# Patient Record
Sex: FEMALE | Race: ASIAN | NOT HISPANIC OR LATINO | Employment: FULL TIME
[De-identification: names, ages, dates, MRNs, and addresses within clinical notes are randomized per-mention and may not be internally consistent; named-entity substitution may affect disease eponyms.]

---

## 2017-02-21 ENCOUNTER — HOSPITAL (OUTPATIENT)
Dept: OTHER | Age: 52
End: 2017-02-21
Attending: INTERNAL MEDICINE

## 2017-10-13 ENCOUNTER — HOSPITAL ENCOUNTER (OUTPATIENT)
Dept: ULTRASOUND IMAGING | Facility: HOSPITAL | Age: 52
Discharge: HOME | End: 2017-10-13
Attending: INTERNAL MEDICINE
Payer: COMMERCIAL

## 2017-10-13 DIAGNOSIS — R94.5 ABNORMAL RESULTS OF LIVER FUNCTION STUDIES: ICD-10-CM

## 2017-10-13 PROCEDURE — 76700 US EXAM ABDOM COMPLETE: CPT | Performed by: INTERNAL MEDICINE

## 2017-12-14 PROBLEM — Z13.220 SCREENING FOR CHOLESTEROL LEVEL: Status: ACTIVE | Noted: 2017-12-14

## 2017-12-14 PROBLEM — Z13.29 SCREENING FOR THYROID DISORDER: Status: ACTIVE | Noted: 2017-12-14

## 2017-12-14 PROBLEM — Z13.1 SCREENING FOR DIABETES MELLITUS: Status: ACTIVE | Noted: 2017-12-14

## 2017-12-14 PROBLEM — Z13.21 ENCOUNTER FOR VITAMIN DEFICIENCY SCREENING: Status: ACTIVE | Noted: 2017-12-14

## 2017-12-14 PROBLEM — Z13.29 SCREENING FOR ENDOCRINE DISORDER: Status: ACTIVE | Noted: 2017-12-14

## 2017-12-14 PROCEDURE — 87624 HPV HI-RISK TYP POOLED RSLT: CPT | Performed by: INTERNAL MEDICINE

## 2017-12-14 PROCEDURE — 88175 CYTOPATH C/V AUTO FLUID REDO: CPT | Performed by: INTERNAL MEDICINE

## 2018-06-05 ENCOUNTER — LAB ENCOUNTER (OUTPATIENT)
Dept: LAB | Age: 53
End: 2018-06-05
Attending: INTERNAL MEDICINE
Payer: COMMERCIAL

## 2018-06-05 DIAGNOSIS — R94.5 ABNORMAL RESULTS OF LIVER FUNCTION STUDIES: Primary | ICD-10-CM

## 2018-06-05 PROCEDURE — 85610 PROTHROMBIN TIME: CPT

## 2018-07-23 ENCOUNTER — HOSPITAL ENCOUNTER (OUTPATIENT)
Dept: ULTRASOUND IMAGING | Facility: HOSPITAL | Age: 53
Discharge: HOME | End: 2018-07-23
Attending: INTERNAL MEDICINE
Payer: COMMERCIAL

## 2018-07-23 DIAGNOSIS — R94.5 ABNORMAL RESULTS OF LIVER FUNCTION STUDIES: ICD-10-CM

## 2018-07-23 PROCEDURE — 76700 US EXAM ABDOM COMPLETE: CPT | Performed by: INTERNAL MEDICINE

## 2019-04-16 ENCOUNTER — HOSPITAL ENCOUNTER (OUTPATIENT)
Dept: CT IMAGING | Facility: HOSPITAL | Age: 54
Discharge: HOME | End: 2019-04-16
Attending: INTERNAL MEDICINE
Payer: COMMERCIAL

## 2019-04-16 DIAGNOSIS — R79.89 ABNORMAL LFTS (LIVER FUNCTION TESTS): ICD-10-CM

## 2019-04-16 PROCEDURE — 74170 CT ABD WO CNTRST FLWD CNTRST: CPT | Performed by: INTERNAL MEDICINE

## 2019-04-16 PROCEDURE — 82565 ASSAY OF CREATININE: CPT

## 2019-08-01 ENCOUNTER — APPOINTMENT (OUTPATIENT)
Dept: GENERAL RADIOLOGY | Age: 54
End: 2019-08-01
Attending: PHYSICIAN ASSISTANT
Payer: COMMERCIAL

## 2019-08-01 ENCOUNTER — HOSPITAL ENCOUNTER (OUTPATIENT)
Age: 54
Discharge: HOME | End: 2019-08-01
Payer: COMMERCIAL

## 2019-08-01 VITALS
TEMPERATURE: 98 F | DIASTOLIC BLOOD PRESSURE: 72 MMHG | HEART RATE: 72 BPM | RESPIRATION RATE: 16 BRPM | SYSTOLIC BLOOD PRESSURE: 122 MMHG | OXYGEN SATURATION: 98 %

## 2019-08-01 DIAGNOSIS — M79.672 ACUTE FOOT PAIN, LEFT: ICD-10-CM

## 2019-08-01 DIAGNOSIS — M10.9 ACUTE GOUT OF LEFT FOOT, UNSPECIFIED CAUSE: Primary | ICD-10-CM

## 2019-08-01 LAB
#MXD IC: 0.7 X10ˆ3/UL (ref 0.1–1)
HCT VFR BLD AUTO: 37.1 % (ref 35–48)
HGB BLD-MCNC: 12.6 G/DL (ref 12–16)
LYMPHOCYTES # BLD AUTO: 2 X10ˆ3/UL (ref 1–4)
LYMPHOCYTES NFR BLD AUTO: 21.7 %
MCH RBC QN AUTO: 28.3 PG (ref 26–34)
MCHC RBC AUTO-ENTMCNC: 34 G/DL (ref 31–37)
MCV RBC AUTO: 83.2 FL (ref 80–100)
MIXED CELL %: 7.7 %
NEUTROPHILS # BLD AUTO: 6.4 X10ˆ3/UL (ref 1.5–7.7)
NEUTROPHILS NFR BLD AUTO: 70.6 %
PLATELET # BLD AUTO: 255 X10ˆ3/UL (ref 150–450)
RBC # BLD AUTO: 4.46 X10ˆ6/UL (ref 3.8–5.3)
URATE SERPL-MCNC: 4.7 MG/DL (ref 2.6–6)
WBC # BLD AUTO: 9.1 X10ˆ3/UL (ref 4–11)

## 2019-08-01 PROCEDURE — 90471 IMMUNIZATION ADMIN: CPT

## 2019-08-01 PROCEDURE — 73630 X-RAY EXAM OF FOOT: CPT | Performed by: PHYSICIAN ASSISTANT

## 2019-08-01 PROCEDURE — 96372 THER/PROPH/DIAG INJ SC/IM: CPT

## 2019-08-01 PROCEDURE — 84550 ASSAY OF BLOOD/URIC ACID: CPT | Performed by: PHYSICIAN ASSISTANT

## 2019-08-01 PROCEDURE — 36415 COLL VENOUS BLD VENIPUNCTURE: CPT

## 2019-08-01 PROCEDURE — 99214 OFFICE O/P EST MOD 30 MIN: CPT

## 2019-08-01 PROCEDURE — 99204 OFFICE O/P NEW MOD 45 MIN: CPT

## 2019-08-01 PROCEDURE — 85025 COMPLETE CBC W/AUTO DIFF WBC: CPT | Performed by: PHYSICIAN ASSISTANT

## 2019-08-01 RX ORDER — COLCHICINE 0.6 MG/1
TABLET ORAL
Qty: 3 TABLET | Refills: 0 | Status: SHIPPED | OUTPATIENT
Start: 2019-08-01 | End: 2019-12-30 | Stop reason: ALTCHOICE

## 2019-08-01 RX ORDER — KETOROLAC TROMETHAMINE 30 MG/ML
60 INJECTION, SOLUTION INTRAMUSCULAR; INTRAVENOUS ONCE
Status: COMPLETED | OUTPATIENT
Start: 2019-08-01 | End: 2019-08-01

## 2019-08-01 RX ORDER — IBUPROFEN 600 MG/1
600 TABLET ORAL ONCE
Status: COMPLETED | OUTPATIENT
Start: 2019-08-01 | End: 2019-08-01

## 2019-08-01 NOTE — ED PROVIDER NOTES
Patient Seen in: [facility]    History   Patient presents with:   Ball Of Foot Pain    Stated Complaint: bottom & ball of lt foot swollen & painful x 2 days    HPI    60-year-old female here with complaint of pain swelling and re She appears well-developed and well-nourished. HENT:   Head: Normocephalic.    Right Ear: External ear normal.   Left Ear: External ear normal.   Nose: Nose normal.   Mouth/Throat: Oropharynx is clear and moist.   Eyes: Pupils are equal, round, and reacti CONCLUSION:  There is a 6 x 3 mm erosion involving the medial head of the left 1st metatarsal with adjacent soft tissue swelling. Findings would be most consistent with gout.   There is a 4 mm oval corticated ossification medial to the left 1st metatar

## 2019-08-01 NOTE — ED INITIAL ASSESSMENT (HPI)
Patient reports ball of the left foot hurting and swollen with redness since yesterday. Denies known injury. 19:38 04:35 07:10

## 2019-12-06 ENCOUNTER — HOSPITAL ENCOUNTER (OUTPATIENT)
Dept: ULTRASOUND IMAGING | Age: 54
Discharge: HOME | End: 2019-12-06
Attending: INTERNAL MEDICINE
Payer: COMMERCIAL

## 2019-12-06 DIAGNOSIS — K76.89 LIVER CYST: ICD-10-CM

## 2019-12-06 PROCEDURE — 76700 US EXAM ABDOM COMPLETE: CPT | Performed by: INTERNAL MEDICINE

## 2021-05-27 ENCOUNTER — HOSPITAL ENCOUNTER (OUTPATIENT)
Dept: MAMMOGRAPHY | Age: 56
Discharge: HOME | End: 2021-05-27
Attending: OBSTETRICS & GYNECOLOGY

## 2021-05-27 ENCOUNTER — HOSPITAL ENCOUNTER (OUTPATIENT)
Dept: GENERAL RADIOLOGY | Age: 56
Discharge: HOME | End: 2021-05-27
Attending: OBSTETRICS & GYNECOLOGY

## 2021-05-27 DIAGNOSIS — Z12.31 VISIT FOR SCREENING MAMMOGRAM: ICD-10-CM

## 2021-05-27 DIAGNOSIS — Z13.820 SCREENING FOR OSTEOPOROSIS: ICD-10-CM

## 2021-05-27 PROCEDURE — 77080 DXA BONE DENSITY AXIAL: CPT

## 2021-05-27 PROCEDURE — 77063 BREAST TOMOSYNTHESIS BI: CPT

## 2021-12-30 ENCOUNTER — APPOINTMENT (OUTPATIENT)
Dept: LAB | Age: 56
End: 2021-12-30
Attending: INTERNAL MEDICINE

## 2021-12-30 ENCOUNTER — HOSPITAL ENCOUNTER (OUTPATIENT)
Dept: MRI IMAGING | Age: 56
Discharge: HOME | End: 2021-12-30
Attending: INTERNAL MEDICINE

## 2021-12-30 DIAGNOSIS — K76.89 LIVER CYST: ICD-10-CM

## 2021-12-30 PROCEDURE — A9577 INJ MULTIHANCE: HCPCS | Performed by: INTERNAL MEDICINE

## 2021-12-30 PROCEDURE — 74183 MRI ABD W/O CNTR FLWD CNTR: CPT

## 2021-12-30 PROCEDURE — 10002805 HB CONTRAST AGENT: Performed by: INTERNAL MEDICINE

## 2021-12-30 RX ADMIN — GADOBENATE DIMEGLUMINE 11 ML: 529 INJECTION, SOLUTION INTRAVENOUS at 10:54

## 2022-04-05 ENCOUNTER — OFFICE VISIT (OUTPATIENT)
Dept: SURGERY | Age: 57
End: 2022-04-05
Attending: SURGERY

## 2022-04-05 DIAGNOSIS — K76.89 HEPATIC CYST: Primary | ICD-10-CM

## 2022-04-05 PROCEDURE — 99211 OFF/OP EST MAY X REQ PHY/QHP: CPT | Performed by: SURGERY

## 2022-04-05 PROCEDURE — 99242 OFF/OP CONSLTJ NEW/EST SF 20: CPT | Performed by: SURGERY

## 2023-09-28 DIAGNOSIS — R92.2 DENSE BREASTS: ICD-10-CM

## 2023-09-28 DIAGNOSIS — R92.30 DENSE BREASTS: ICD-10-CM

## 2023-09-28 DIAGNOSIS — Z12.39 ENCOUNTER FOR SCREENING FOR MALIGNANT NEOPLASM OF BREAST: Primary | ICD-10-CM

## 2023-09-29 DIAGNOSIS — Z12.31 ENCOUNTER FOR SCREENING MAMMOGRAM FOR MALIGNANT NEOPLASM OF BREAST: Primary | ICD-10-CM

## 2023-11-08 ENCOUNTER — APPOINTMENT (OUTPATIENT)
Dept: CT IMAGING | Age: 58
End: 2023-11-08
Attending: OBSTETRICS & GYNECOLOGY

## 2025-01-09 RX ORDER — ASPIRIN 81 MG/1
81 TABLET, CHEWABLE ORAL AT BEDTIME
COMMUNITY

## 2025-01-09 RX ORDER — ATORVASTATIN CALCIUM 20 MG/1
20 TABLET, FILM COATED ORAL EVERY EVENING
COMMUNITY

## 2025-01-09 ASSESSMENT — ACTIVITIES OF DAILY LIVING (ADL)
HISTORY OF FALLING IN THE LAST YEAR (PRIOR TO ADMISSION): NO
ADL_SCORE: 12
ADL_SHORT_OF_BREATH: NO
ADL_BEFORE_ADMISSION: INDEPENDENT
NEEDS_ASSIST: NO
RECENT_DECLINE_ADL: NO

## 2025-01-16 ENCOUNTER — ANESTHESIA EVENT (OUTPATIENT)
Dept: GASTROENTEROLOGY | Age: 60
End: 2025-01-16

## 2025-01-16 ENCOUNTER — HOSPITAL ENCOUNTER (OUTPATIENT)
Dept: CT IMAGING | Age: 60
Discharge: HOME | End: 2025-01-16
Attending: INTERNAL MEDICINE

## 2025-01-16 ENCOUNTER — ANESTHESIA (OUTPATIENT)
Dept: GASTROENTEROLOGY | Age: 60
End: 2025-01-16

## 2025-01-16 ENCOUNTER — HOSPITAL ENCOUNTER (OUTPATIENT)
Dept: GASTROENTEROLOGY | Age: 60
Discharge: HOME | End: 2025-01-16
Attending: INTERNAL MEDICINE

## 2025-01-16 VITALS
WEIGHT: 122.58 LBS | SYSTOLIC BLOOD PRESSURE: 120 MMHG | DIASTOLIC BLOOD PRESSURE: 76 MMHG | OXYGEN SATURATION: 96 % | HEART RATE: 84 BPM | RESPIRATION RATE: 17 BRPM | BODY MASS INDEX: 24.71 KG/M2 | HEIGHT: 59 IN | TEMPERATURE: 97.7 F

## 2025-01-16 DIAGNOSIS — K86.2 PANCREATIC CYST (CMD): ICD-10-CM

## 2025-01-16 DIAGNOSIS — Z01.812 PRE-PROCEDURAL LABORATORY EXAMINATION: Primary | ICD-10-CM

## 2025-01-16 DIAGNOSIS — R97.8 ELEVATED TUMOR MARKERS: ICD-10-CM

## 2025-01-16 DIAGNOSIS — Z01.810 PRE-OPERATIVE CARDIOVASCULAR EXAMINATION: ICD-10-CM

## 2025-01-16 LAB
ALBUMIN SERPL-MCNC: 4 G/DL (ref 3.4–5)
ALBUMIN/GLOB SERPL: 1.1 {RATIO} (ref 1–2.4)
ALP SERPL-CCNC: 123 UNITS/L (ref 45–117)
ALT SERPL-CCNC: 32 UNITS/L
ANION GAP SERPL CALC-SCNC: 11 MMOL/L (ref 7–19)
AST SERPL-CCNC: 20 UNITS/L
BASOPHILS # BLD: 0.1 K/MCL (ref 0–0.3)
BASOPHILS NFR BLD: 1 %
BILIRUB SERPL-MCNC: 0.7 MG/DL (ref 0.2–1)
BUN SERPL-MCNC: 11 MG/DL (ref 6–20)
BUN/CREAT SERPL: 14 (ref 7–25)
CALCIUM SERPL-MCNC: 8.9 MG/DL (ref 8.4–10.2)
CANCER AG19-9 SERPL-ACNC: 54 UNITS/ML (ref 0–35)
CEA SERPL-MCNC: <2 NG/ML (ref 0–5)
CHLORIDE SERPL-SCNC: 104 MMOL/L (ref 97–110)
CO2 SERPL-SCNC: 31 MMOL/L (ref 21–32)
CREAT SERPL-MCNC: 0.76 MG/DL (ref 0.51–0.95)
DEPRECATED RDW RBC: 41.3 FL (ref 39–50)
EGFRCR SERPLBLD CKD-EPI 2021: >90 ML/MIN/{1.73_M2}
EOSINOPHIL # BLD: 0.2 K/MCL (ref 0–0.5)
EOSINOPHIL NFR BLD: 2 %
ERYTHROCYTE [DISTWIDTH] IN BLOOD: 13.2 % (ref 11–15)
FASTING DURATION TIME PATIENT: ABNORMAL H
GLOBULIN SER-MCNC: 3.8 G/DL (ref 2–4)
GLUCOSE SERPL-MCNC: 92 MG/DL (ref 70–99)
HCT VFR BLD CALC: 37.6 % (ref 36–46.5)
HGB BLD-MCNC: 11.9 G/DL (ref 12–15.5)
IMM GRANULOCYTES # BLD AUTO: 0 K/MCL (ref 0–0.2)
IMM GRANULOCYTES # BLD: 0 %
INR PPP: 1
LYMPHOCYTES # BLD: 2.2 K/MCL (ref 1–4)
LYMPHOCYTES NFR BLD: 30 %
MCH RBC QN AUTO: 27 PG (ref 26–34)
MCHC RBC AUTO-ENTMCNC: 31.6 G/DL (ref 32–36.5)
MCV RBC AUTO: 85.5 FL (ref 78–100)
MONOCYTES # BLD: 0.5 K/MCL (ref 0.3–0.9)
MONOCYTES NFR BLD: 7 %
NEUTROPHILS # BLD: 4.4 K/MCL (ref 1.8–7.7)
NEUTROPHILS NFR BLD: 60 %
NRBC BLD MANUAL-RTO: 0 /100 WBC
PLATELET # BLD AUTO: 260 K/MCL (ref 140–450)
POTASSIUM SERPL-SCNC: 3.6 MMOL/L (ref 3.4–5.1)
PROT SERPL-MCNC: 7.8 G/DL (ref 6.4–8.2)
PROTHROMBIN TIME: 10.9 SEC (ref 9.7–11.8)
RBC # BLD: 4.4 MIL/MCL (ref 4–5.2)
SARS-COV-2 RNA RESP QL NAA+PROBE: NOT DETECTED
SERVICE CMNT-IMP: NORMAL
SERVICE CMNT-IMP: NORMAL
SODIUM SERPL-SCNC: 142 MMOL/L (ref 135–145)
WBC # BLD: 7.3 K/MCL (ref 4.2–11)

## 2025-01-16 PROCEDURE — 87635 SARS-COV-2 COVID-19 AMP PRB: CPT | Performed by: INTERNAL MEDICINE

## 2025-01-16 PROCEDURE — 74160 CT ABDOMEN W/CONTRAST: CPT

## 2025-01-16 PROCEDURE — 10002807 HB RX 258: Performed by: INTERNAL MEDICINE

## 2025-01-16 PROCEDURE — 85025 COMPLETE CBC W/AUTO DIFF WBC: CPT | Performed by: INTERNAL MEDICINE

## 2025-01-16 PROCEDURE — 13000008 HB ANESTHESIA MAC OUTSIDE OR

## 2025-01-16 PROCEDURE — 80053 COMPREHEN METABOLIC PANEL: CPT | Performed by: INTERNAL MEDICINE

## 2025-01-16 PROCEDURE — 85610 PROTHROMBIN TIME: CPT | Performed by: INTERNAL MEDICINE

## 2025-01-16 PROCEDURE — 86301 IMMUNOASSAY TUMOR CA 19-9: CPT | Performed by: INTERNAL MEDICINE

## 2025-01-16 PROCEDURE — 10004451 HB PACU RECOVERY 1ST 30 MINUTES

## 2025-01-16 PROCEDURE — 13000028 HB GI MAJOR COMPLEX CASE S/U + 1ST 15 MIN

## 2025-01-16 PROCEDURE — 10002801 HB RX 250 W/O HCPCS: Performed by: ANESTHESIOLOGY

## 2025-01-16 PROCEDURE — 13000001 HB PHASE II RECOVERY EA 30 MINUTES

## 2025-01-16 PROCEDURE — 10002800 HB RX 250 W HCPCS: Performed by: ANESTHESIOLOGY

## 2025-01-16 PROCEDURE — 10002805 HB CONTRAST AGENT: Performed by: INTERNAL MEDICINE

## 2025-01-16 PROCEDURE — 82378 CARCINOEMBRYONIC ANTIGEN: CPT | Performed by: INTERNAL MEDICINE

## 2025-01-16 RX ORDER — 0.9 % SODIUM CHLORIDE 0.9 %
2 VIAL (ML) INJECTION EVERY 12 HOURS SCHEDULED
Status: DISCONTINUED | OUTPATIENT
Start: 2025-01-16 | End: 2025-01-18 | Stop reason: HOSPADM

## 2025-01-16 RX ORDER — ONDANSETRON 2 MG/ML
4 INJECTION INTRAMUSCULAR; INTRAVENOUS EVERY 12 HOURS PRN
Status: DISCONTINUED | OUTPATIENT
Start: 2025-01-16 | End: 2025-01-18 | Stop reason: HOSPADM

## 2025-01-16 RX ORDER — LIDOCAINE HYDROCHLORIDE 20 MG/ML
INJECTION, SOLUTION INFILTRATION; PERINEURAL PRN
Status: DISCONTINUED | OUTPATIENT
Start: 2025-01-16 | End: 2025-01-16

## 2025-01-16 RX ORDER — SODIUM CHLORIDE, SODIUM LACTATE, POTASSIUM CHLORIDE, CALCIUM CHLORIDE 600; 310; 30; 20 MG/100ML; MG/100ML; MG/100ML; MG/100ML
INJECTION, SOLUTION INTRAVENOUS CONTINUOUS
Status: DISCONTINUED | OUTPATIENT
Start: 2025-01-16 | End: 2025-01-18 | Stop reason: HOSPADM

## 2025-01-16 RX ORDER — ONDANSETRON 2 MG/ML
4 INJECTION INTRAMUSCULAR; INTRAVENOUS EVERY 4 HOURS PRN
Status: DISCONTINUED | OUTPATIENT
Start: 2025-01-16 | End: 2025-01-18 | Stop reason: HOSPADM

## 2025-01-16 RX ORDER — DEXTROSE MONOHYDRATE 25 G/50ML
25 INJECTION, SOLUTION INTRAVENOUS PRN
Status: DISCONTINUED | OUTPATIENT
Start: 2025-01-16 | End: 2025-01-18 | Stop reason: HOSPADM

## 2025-01-16 RX ORDER — PROPOFOL 10 MG/ML
INJECTION, EMULSION INTRAVENOUS PRN
Status: DISCONTINUED | OUTPATIENT
Start: 2025-01-16 | End: 2025-01-16

## 2025-01-16 RX ORDER — ONDANSETRON 4 MG/1
4 TABLET, ORALLY DISINTEGRATING ORAL EVERY 12 HOURS PRN
Status: DISCONTINUED | OUTPATIENT
Start: 2025-01-16 | End: 2025-01-18 | Stop reason: HOSPADM

## 2025-01-16 RX ORDER — NICOTINE POLACRILEX 4 MG
30 LOZENGE BUCCAL
Status: DISCONTINUED | OUTPATIENT
Start: 2025-01-16 | End: 2025-01-18 | Stop reason: HOSPADM

## 2025-01-16 RX ORDER — 0.9 % SODIUM CHLORIDE 0.9 %
10 VIAL (ML) INJECTION PRN
Status: DISCONTINUED | OUTPATIENT
Start: 2025-01-16 | End: 2025-01-18 | Stop reason: HOSPADM

## 2025-01-16 RX ADMIN — LIDOCAINE HYDROCHLORIDE 5 ML: 20 INJECTION, SOLUTION INFILTRATION; PERINEURAL at 13:28

## 2025-01-16 RX ADMIN — PROPOFOL INJECTABLE EMULSION 100 MCG/KG/MIN: 10 INJECTION, EMULSION INTRAVENOUS at 13:35

## 2025-01-16 RX ADMIN — IOHEXOL 75 ML: 350 INJECTION, SOLUTION INTRAVENOUS at 12:37

## 2025-01-16 RX ADMIN — PROPOFOL 50 MG: 10 INJECTION, EMULSION INTRAVENOUS at 13:32

## 2025-01-16 RX ADMIN — SODIUM CHLORIDE, POTASSIUM CHLORIDE, SODIUM LACTATE AND CALCIUM CHLORIDE: 600; 310; 30; 20 INJECTION, SOLUTION INTRAVENOUS at 11:55

## 2025-01-16 RX ADMIN — PROPOFOL 50 MG: 10 INJECTION, EMULSION INTRAVENOUS at 13:35

## 2025-01-16 RX ADMIN — PROPOFOL 50 MG: 10 INJECTION, EMULSION INTRAVENOUS at 13:30

## 2025-01-16 SDOH — SOCIAL STABILITY: SOCIAL INSECURITY: HOW OFTEN DOES ANYONE, INCLUDING FAMILY AND FRIENDS, INSULT OR TALK DOWN TO YOU?: NEVER

## 2025-01-16 SDOH — SOCIAL STABILITY: SOCIAL INSECURITY: HOW OFTEN DOES ANYONE, INCLUDING FAMILY AND FRIENDS, SCREAM OR CURSE AT YOU?: NEVER

## 2025-01-16 SDOH — SOCIAL STABILITY: SOCIAL INSECURITY: HOW OFTEN DOES ANYONE, INCLUDING FAMILY AND FRIENDS, PHYSICALLY HURT YOU?: NEVER

## 2025-01-16 SDOH — SOCIAL STABILITY: SOCIAL NETWORK
HOW OFTEN DO YOU SEE OR TALK TO PEOPLE THAT YOU CARE ABOUT AND FEEL CLOSE TO? (FOR EXAMPLE: TALKING TO FRIENDS ON THE PHONE, VISITING FRIENDS OR FAMILY, GOING TO CHURCH OR CLUB MEETINGS): 5 OR MORE TIMES A WEEK

## 2025-01-16 SDOH — SOCIAL STABILITY: SOCIAL INSECURITY: HOW OFTEN DOES ANYONE, INCLUDING FAMILY AND FRIENDS, THREATEN YOU WITH HARM?: NEVER

## 2025-01-16 ASSESSMENT — PAIN SCALES - GENERAL
PAINLEVEL_OUTOF10: 0

## 2025-01-16 ASSESSMENT — LIFESTYLE VARIABLES
HOW MANY STANDARD DRINKS CONTAINING ALCOHOL DO YOU HAVE ON A TYPICAL DAY: 0,1 OR 2
HOW OFTEN DO YOU HAVE 6 OR MORE DRINKS ON ONE OCCASION: NEVER
ALCOHOL_USE_STATUS: NO OR LOW RISK WITH VALIDATED TOOL
AUDIT-C TOTAL SCORE: 0
HOW OFTEN DO YOU HAVE A DRINK CONTAINING ALCOHOL: NEVER

## 2025-01-16 ASSESSMENT — ACTIVITIES OF DAILY LIVING (ADL)
RECENT_DECLINE_ADL: NO
ADL_SHORT_OF_BREATH: NO
ADL_BEFORE_ADMISSION: INDEPENDENT
ADL_SCORE: 12

## 2025-01-20 LAB
ASR DISCLAIMER: NORMAL
CASE RPRT: NORMAL
CLINICAL INFO: NORMAL
PATH REPORT.FINAL DX SPEC: NORMAL
PATH REPORT.GROSS SPEC: NORMAL

## 2025-07-09 DIAGNOSIS — K76.89 LIVER CYST: ICD-10-CM

## 2025-07-09 DIAGNOSIS — R97.8 ELEVATED TUMOR MARKERS: Primary | ICD-10-CM

## 2025-07-09 DIAGNOSIS — R94.5 ABNORMAL FINDING ON LIVER FUNCTION: ICD-10-CM

## (undated) NOTE — LETTER
Date & Time: 8/1/2019, 2:27 PM  Patient: Steve Anderson  Encounter Provider(s):    WANDER Plummer       To Whom It May Concern:    Lesa Thomas was seen and treated in our department on 8/1/2019.   Patient will return to work on Monday if feeling